# Patient Record
Sex: MALE | Race: WHITE | NOT HISPANIC OR LATINO
[De-identification: names, ages, dates, MRNs, and addresses within clinical notes are randomized per-mention and may not be internally consistent; named-entity substitution may affect disease eponyms.]

---
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
## 2020-11-18 ENCOUNTER — TRANSCRIPTION ENCOUNTER (OUTPATIENT)
Age: 20
End: 2020-11-18

## 2021-06-17 ENCOUNTER — TRANSCRIPTION ENCOUNTER (OUTPATIENT)
Age: 21
End: 2021-06-17

## 2023-11-27 ENCOUNTER — APPOINTMENT (OUTPATIENT)
Dept: RADIOLOGY | Facility: CLINIC | Age: 23
End: 2023-11-27
Payer: COMMERCIAL

## 2023-11-27 PROCEDURE — 71110 X-RAY EXAM RIBS BIL 3 VIEWS: CPT

## 2023-11-27 PROCEDURE — 72070 X-RAY EXAM THORAC SPINE 2VWS: CPT

## 2023-11-27 PROCEDURE — 71046 X-RAY EXAM CHEST 2 VIEWS: CPT

## 2023-12-21 ENCOUNTER — EMERGENCY (EMERGENCY)
Facility: HOSPITAL | Age: 23
LOS: 1 days | Discharge: HOME | End: 2023-12-21
Attending: EMERGENCY MEDICINE | Admitting: EMERGENCY MEDICINE
Payer: COMMERCIAL

## 2023-12-21 VITALS
TEMPERATURE: 98 F | DIASTOLIC BLOOD PRESSURE: 65 MMHG | HEART RATE: 95 BPM | RESPIRATION RATE: 18 BRPM | SYSTOLIC BLOOD PRESSURE: 108 MMHG | OXYGEN SATURATION: 99 %

## 2023-12-21 VITALS
RESPIRATION RATE: 15 BRPM | WEIGHT: 164.91 LBS | TEMPERATURE: 99 F | DIASTOLIC BLOOD PRESSURE: 66 MMHG | OXYGEN SATURATION: 98 % | SYSTOLIC BLOOD PRESSURE: 111 MMHG | HEIGHT: 69 IN | HEART RATE: 93 BPM

## 2023-12-21 PROCEDURE — 73000 X-RAY EXAM OF COLLAR BONE: CPT | Mod: 26,50

## 2023-12-21 PROCEDURE — 73030 X-RAY EXAM OF SHOULDER: CPT | Mod: 26,LT

## 2023-12-21 PROCEDURE — 73030 X-RAY EXAM OF SHOULDER: CPT

## 2023-12-21 PROCEDURE — 99284 EMERGENCY DEPT VISIT MOD MDM: CPT | Mod: 25

## 2023-12-21 PROCEDURE — 73000 X-RAY EXAM OF COLLAR BONE: CPT

## 2023-12-21 PROCEDURE — 71101 X-RAY EXAM UNILAT RIBS/CHEST: CPT | Mod: 26

## 2023-12-21 PROCEDURE — 71101 X-RAY EXAM UNILAT RIBS/CHEST: CPT

## 2023-12-21 PROCEDURE — 99285 EMERGENCY DEPT VISIT HI MDM: CPT

## 2023-12-21 RX ORDER — OXYCODONE AND ACETAMINOPHEN 5; 325 MG/1; MG/1
1 TABLET ORAL ONCE
Refills: 0 | Status: DISCONTINUED | OUTPATIENT
Start: 2023-12-21 | End: 2023-12-21

## 2023-12-21 RX ORDER — OXYCODONE AND ACETAMINOPHEN 5; 325 MG/1; MG/1
1 TABLET ORAL
Qty: 12 | Refills: 0
Start: 2023-12-21 | End: 2023-12-23

## 2023-12-21 RX ADMIN — OXYCODONE AND ACETAMINOPHEN 1 TABLET(S): 5; 325 TABLET ORAL at 15:33

## 2023-12-21 NOTE — ED PROVIDER NOTE - CARE PROVIDER_API CALL
Richard Chambers  Orthopaedic Surgery  [address] [place] [address]  Phone: [phone]  Fax: [phone]  Follow Up Time: 1-3 Days   Richard Chambers  Orthopaedic Surgery  [address] [place] [address]  Phone: [phone]  Fax: [phone]  Follow Up Time: 1-3 Days

## 2023-12-21 NOTE — ED PROVIDER NOTE - PROGRESS NOTE DETAILS
case discussed with ortho PA Lavon, recommends sling and close ortho follow up. Patient was scheduled with appointment for next tuesday with Dr. Chambers.

## 2023-12-21 NOTE — ED ADULT NURSE NOTE - NS ED NOTE ABUSE SUSPICION NEGLECT YN
[FreeTextEntry1] : I performed a venous duplex which was medically necessary to evaluate for DVT. It showed no evidence of DVT in the lower extremities bilaterally.\par  No

## 2023-12-21 NOTE — ED PROVIDER NOTE - MUSCULOSKELETAL MINIMAL EXAM
Tenderness to right anterior chest wall, no step-off or crepitus.  Tenderness to left shoulder and distal clavicle with obvious deformity at AC joint concerning for for AC separation.  No midline cervical, thoracic or lumbar tenderness.  No pelvic tenderness with full range of motion of hips.  Radial and DP pulses equal and intact.

## 2023-12-21 NOTE — ED PROCEDURE NOTE - CPROC ED TIME OUT STATEMENT1
“Patient's name, , procedure and correct site were confirmed during the [place] Timeout.” “Patient's name, , procedure and correct site were confirmed during the [place] Timeout.”

## 2023-12-21 NOTE — ED ADULT TRIAGE NOTE - CHIEF COMPLAINT QUOTE
Left shoulder pain and discomfort s/p fall while snow-boarding 3 hours ago. Pt able to move finger freely. Pt also c/o right clavicle pain and inability to take a full breath

## 2023-12-21 NOTE — ED ADULT NURSE NOTE - OBJECTIVE STATEMENT
Pt fell snowboarding this am, c/o 8/10 pain to L shoulder and R collarbone. States he hears "crackling/popping" noises when he deep breathes.

## 2023-12-21 NOTE — ED PROVIDER NOTE - PROVIDER TOKENS
PROVIDER:[TOKEN:[[id number]],FOLLOWUP:[1-3 Days]] PROVIDER:[TOKEN:[[id number]],FOLLOWUP:[1-3 Days]]

## 2023-12-21 NOTE — ED PROVIDER NOTE - MUSCULOSKELETAL [+], MLM
+ bilateral clavicle pain, + right anterior chest wall pain, + left shoulder pain/LIMITED RANGE OF MOTION

## 2023-12-21 NOTE — ED ADULT NURSE NOTE - NSFALLUNIVINTERV_ED_ALL_ED
Bed/Stretcher in lowest position, wheels locked, appropriate side rails in place/Call bell, personal items and telephone in reach/Instruct patient to call for assistance before getting out of bed/chair/stretcher/Non-slip footwear applied when patient is off stretcher/[place] to call system/Physically safe environment - no spills, clutter or unnecessary equipment/Purposeful proactive rounding/Room/bathroom lighting operational, light cord in reach Bed/Stretcher in lowest position, wheels locked, appropriate side rails in place/Call bell, personal items and telephone in reach/Instruct patient to call for assistance before getting out of bed/chair/stretcher/Non-slip footwear applied when patient is off stretcher/[place] to call system/Physically safe environment - no spills, clutter or unnecessary equipment/Purposeful proactive rounding/Room/bathroom lighting operational, light cord in reach

## 2023-12-21 NOTE — ED PROVIDER NOTE - CARE PLAN
1 Principal Discharge DX:	Separation of AC joint, left, initial encounter  Secondary Diagnosis:	Rib fracture

## 2023-12-21 NOTE — ED PROVIDER NOTE - PATIENT PORTAL LINK FT
You can access the FollowMyHealth Patient Portal offered by [facility] by registering at the following website: [email or web address]. By joining [employer]’s FollowMyHealth portal, you will also be able to view your health information using other applications (apps) compatible with our system. You can access the FollowMyHealth Patient Portal offered by [facility] by registering at the following website: [email or web address]. By joining [employer]’s FollowMyHealth portal, you will also be able to view your health information using other applications (apps) compatible with our system.

## 2023-12-21 NOTE — ED PROVIDER NOTE - DIFFERENTIAL DIAGNOSIS
rib fracture, clavicle fracture, shoulder fracture, dislocation, ac joint separation, pneumothorax Differential Diagnosis

## 2023-12-21 NOTE — ED PROVIDER NOTE - NSFOLLOWUPINSTRUCTIONS_ED_ALL_ED_FT
You were scheduled for an appointment with Dr. Richard Chambers on Tuesday 12/27 at 9:45 am.     Shoulder Separation    A shoulder separation (acromioclavicular separation) is an injury to the connecting tissue (ligament) between the top of your shoulder blade (acromion) and your collarbone (clavicle).     The ligament may be stretched, partially torn, or completely torn.    A stretched ligament may not cause very much pain, and it does not move the collarbone out of place. A stretched ligament looks normal on an X-ray.  An injury that is a bit worse may partially tear a ligament and move the collarbone slightly out of place.  A serious injury completely tears both shoulder ligaments. This moves the collarbone severely out of position and changes the way that the shoulder looks (deformity).    CAUSES  The most common cause of a shoulder separation is falling on or receiving a blow to the top of the shoulder. Falling with an outstretched arm may also cause this injury.    RISK FACTORS  You may be at greater risk of a shoulder separation if:    You are male.  You are younger than age 35.  You play a contact sport, such as football or hockey.    SIGNS AND SYMPTOMS  The most common symptom of a shoulder separation is pain on the top of the shoulder after falling on it or receiving a blow to it. Other signs and symptoms include:    Shoulder deformity.  Swelling of the shoulder.  Decreased ability to move the shoulder.  Bruising on top of the shoulder.    DIAGNOSIS  Your health care provider may suspect a shoulder separation based on your symptoms and the details of a recent injury. A physical exam will be done. During this exam, the health care provider may:    Press on your shoulder.  Test the movement of your shoulder.  Ask you to hold a weight in your hand to see if the separation increases.  Do an X-ray.    TREATMENT  A stretch injury may require only a sling, pain medicine, and cold packs. This treatment may last for 2–12 weeks. You may also have physical therapy. A physical therapist will teach you to do daily exercises to strengthen your shoulder muscles and prevent stiffness.  A complete tear may require surgery to repair the torn ligament. After surgery, you will also require a sling, pain medicine, and cold packs. Recovery may take longer. You may also need more physical therapy.    HOME CARE INSTRUCTIONS  Take medicines only as directed by your health care provider.  Apply ice to the top of your shoulder:  Put ice in a plastic bag.  Place a towel between your skin and the bag.  Leave the ice on for 20 minutes, 2–3 times a day.  Wear your sling or splint as directed by your health care provider.   You may be able to remove your sling to do your physical therapy exercises.  Ask your health care provider when you can stop wearing the sling.  Do not do any activities that make your pain worse.  Do not lift anything that is heavier than 10 lb (4.5 kg) on the injured side of your body.  Ask your health care provider when you can return to athletic activities.    SEEK MEDICAL CARE IF:  Your pain medicine is not relieving your pain.  Your pain and stiffness are not improving after 2 weeks.  You are unable to do your physical therapy exercises because of pain or stiffness.    ADDITIONAL NOTES AND INSTRUCTIONS    Please follow up with your Primary MD in 24-48 hr.  Seek immediate medical care for any new/worsening signs or symptoms.     Rib Fracture    WHAT YOU NEED TO KNOW:    A rib fracture is a crack or break in a rib bone. Rib fractures usually heal within 6 weeks. You should be able to return to normal activities before that time. Do not wrap anything around your body to try to splint your ribs. This can prevent you from taking deep breaths and increases your risk for pneumonia.Rib Cage         DISCHARGE INSTRUCTIONS:    Call 911 for any of the following:     You have trouble breathing.      You have new or increased pain.    Return to the emergency department if:     Your pain does not get better, even after treatment.      You have a fever or a cough.     Contact your healthcare provider if:     You have questions or concerns about your condition or care.        Medicines:     NSAIDs help decrease swelling and pain. NSAIDs are available without a doctor's order. Ask your healthcare provider which medicine is right for you. Ask how much to take and when to take it. Take as directed. NSAIDs can cause stomach bleeding and kidney problems if not taken correctly.      Prescription pain medicine may be given. Ask your healthcare provider how to take this medicine safely. Some prescription pain medicines contain acetaminophen. Do not take other medicines that contain acetaminophen without talking to your healthcare provider. Too much acetaminophen may cause liver damage. Prescription pain medicine may cause constipation. Ask your healthcare provider how to prevent or treat constipation.       Take your medicine as directed. Contact your healthcare provider if you think your medicine is not helping or if you have side effects. Tell him or her if you are allergic to any medicine. Keep a list of the medicines, vitamins, and herbs you take. Include the amounts, and when and why you take them. Bring the list or the pill bottles to follow-up visits. Carry your medicine list with you in case of an emergency.    Follow up with your healthcare provider as directed: Write down your questions so you remember to ask them during your visits.    Deep breathing: Deep breathing will decrease your risk for pneumonia. Hug a pillow on the injured side while doing this exercise, to decrease pain. Take a deep breath and hold it for as long as possible. You should let the air out and then cough strongly. Deep breaths help open your airway. You may be given an incentive spirometer to help take deep breaths. Put the plastic piece in your mouth. Take a slow, deep breath. You should then let the air out and cough. Repeat these steps 10 times every hour.    Rest: Rest and limit activity to decrease swelling and pain, and allow your injury to heal. Avoid activities that may cause more pain or damage to your ribs such as, pulling, pushing, and lifting. As your pain decreases, begin movements slowly. Take short walks between rest periods.    Ice: Apply ice on the fractured area for 15 to 20 minutes every hour or as directed. Use an ice pack or put crushed ice in a plastic bag. Cover it with a towel. Ice helps prevent tissue damage and decreases swelling and pain.       © Copyright [employer] 2019 All illustrations and images included in CareNotes are the copyrighted property of [employer] or [employer] You were scheduled for an appointment with Dr. Richard Chambers on Tuesday 12/27 at 9:45 am.     Shoulder Separation    A shoulder separation (acromioclavicular separation) is an injury to the connecting tissue (ligament) between the top of your shoulder blade (acromion) and your collarbone (clavicle).     The ligament may be stretched, partially torn, or completely torn.    A stretched ligament may not cause very much pain, and it does not move the collarbone out of place. A stretched ligament looks normal on an X-ray.  An injury that is a bit worse may partially tear a ligament and move the collarbone slightly out of place.  A serious injury completely tears both shoulder ligaments. This moves the collarbone severely out of position and changes the way that the shoulder looks (deformity).    CAUSES  The most common cause of a shoulder separation is falling on or receiving a blow to the top of the shoulder. Falling with an outstretched arm may also cause this injury.    RISK FACTORS  You may be at greater risk of a shoulder separation if:    You are male.  You are younger than age 35.  You play a contact sport, such as football or hockey.    SIGNS AND SYMPTOMS  The most common symptom of a shoulder separation is pain on the top of the shoulder after falling on it or receiving a blow to it. Other signs and symptoms include:    Shoulder deformity.  Swelling of the shoulder.  Decreased ability to move the shoulder.  Bruising on top of the shoulder.    DIAGNOSIS  Your health care provider may suspect a shoulder separation based on your symptoms and the details of a recent injury. A physical exam will be done. During this exam, the health care provider may:    Press on your shoulder.  Test the movement of your shoulder.  Ask you to hold a weight in your hand to see if the separation increases.  Do an X-ray.    TREATMENT  A stretch injury may require only a sling, pain medicine, and cold packs. This treatment may last for 2–12 weeks. You may also have physical therapy. A physical therapist will teach you to do daily exercises to strengthen your shoulder muscles and prevent stiffness.  A complete tear may require surgery to repair the torn ligament. After surgery, you will also require a sling, pain medicine, and cold packs. Recovery may take longer. You may also need more physical therapy.    HOME CARE INSTRUCTIONS  Take medicines only as directed by your health care provider.  Apply ice to the top of your shoulder:  Put ice in a plastic bag.  Place a towel between your skin and the bag.  Leave the ice on for 20 minutes, 2–3 times a day.  Wear your sling or splint as directed by your health care provider.   You may be able to remove your sling to do your physical therapy exercises.  Ask your health care provider when you can stop wearing the sling.  Do not do any activities that make your pain worse.  Do not lift anything that is heavier than 10 lb (4.5 kg) on the injured side of your body.  Ask your health care provider when you can return to athletic activities.    SEEK MEDICAL CARE IF:  Your pain medicine is not relieving your pain.  Your pain and stiffness are not improving after 2 weeks.  You are unable to do your physical therapy exercises because of pain or stiffness.    ADDITIONAL NOTES AND INSTRUCTIONS    Please follow up with your Primary MD in 24-48 hr.  Seek immediate medical care for any new/worsening signs or symptoms.     Rib Fracture    WHAT YOU NEED TO KNOW:    A rib fracture is a crack or break in a rib bone. Rib fractures usually heal within 6 weeks. You should be able to return to normal activities before that time. Do not wrap anything around your body to try to splint your ribs. This can prevent you from taking deep breaths and increases your risk for pneumonia.Rib Cage         DISCHARGE INSTRUCTIONS:    Call 911 for any of the following:     You have trouble breathing.      You have new or increased pain.    Return to the emergency department if:     Your pain does not get better, even after treatment.      You have a fever or a cough.     Contact your healthcare provider if:     You have questions or concerns about your condition or care.        Medicines:     NSAIDs help decrease swelling and pain. NSAIDs are available without a doctor's order. Ask your healthcare provider which medicine is right for you. Ask how much to take and when to take it. Take as directed. NSAIDs can cause stomach bleeding and kidney problems if not taken correctly.      Prescription pain medicine may be given. Ask your healthcare provider how to take this medicine safely. Some prescription pain medicines contain acetaminophen. Do not take other medicines that contain acetaminophen without talking to your healthcare provider. Too much acetaminophen may cause liver damage. Prescription pain medicine may cause constipation. Ask your healthcare provider how to prevent or treat constipation.       Take your medicine as directed. Contact your healthcare provider if you think your medicine is not helping or if you have side effects. Tell him or her if you are allergic to any medicine. Keep a list of the medicines, vitamins, and herbs you take. Include the amounts, and when and why you take them. Bring the list or the pill bottles to follow-up visits. Carry your medicine list with you in case of an emergency.    Follow up with your healthcare provider as directed: Write down your questions so you remember to ask them during your visits.    Deep breathing: Deep breathing will decrease your risk for pneumonia. Hug a pillow on the injured side while doing this exercise, to decrease pain. Take a deep breath and hold it for as long as possible. You should let the air out and then cough strongly. Deep breaths help open your airway. You may be given an incentive spirometer to help take deep breaths. Put the plastic piece in your mouth. Take a slow, deep breath. You should then let the air out and cough. Repeat these steps 10 times every hour.    Rest: Rest and limit activity to decrease swelling and pain, and allow your injury to heal. Avoid activities that may cause more pain or damage to your ribs such as, pulling, pushing, and lifting. As your pain decreases, begin movements slowly. Take short walks between rest periods.    Ice: Apply ice on the fractured area for 15 to 20 minutes every hour or as directed. Use an ice pack or put crushed ice in a plastic bag. Cover it with a towel. Ice helps prevent tissue damage and decreases swelling and pain.       © Copyright [employer] 2019 All illustrations and images included in CareNotes are the copyrighted property of [employer] or [employer]

## 2023-12-21 NOTE — ED PROVIDER NOTE - CLINICAL SUMMARY MEDICAL DECISION MAKING FREE TEXT BOX
Patient is a 23-year-old male who presents to the emergency room with left shoulder pain and right rib pain status post fall while snowboarding.  Patient with no significant past medical history is not on any medication.  Reports around 10 AM he was snowboarding was wearing protective gear including a helmet.  He suffered a fall and has been complaining of right rib pain worse with movement and deep inspiration and left shoulder pain.  Also with bilateral clavicular pain.  Patient is right-hand dominant.  Denies striking his head or LOC.  Patient is not on anticoagulants.  Was able to stand has been ambulatory since then drove back to [place].  Denies any extremity numbness or weakness.  Denies any headache visual changes nausea vomiting abdominal pain.  On exam patient is sitting no acute distress speaking in full sentences no signs or symptoms of respiratory distress no hypoxia on room air.  Normocephalic atraumatic pupils equal round and reactive heart is regular rate lungs are clear to auscultation there is chest wall tenderness to palpation to the right lateral ribs.  Abdomen soft nontender nondistended.  No bruising noted to the chest or abdomen.  No midline C-T-L tenderness to palpation.  No deformity noted to the right clavicle patient with full range of motion of the right shoulder no tenderness to palpation to the right shoulder.  No tenderness palpation to the right elbow forearm wrist or hand.  Neurovascular intact right upper extremity.  There is what appears to be a significant AC separation of the left clavicle shoulder.  No acute bony deformity to the humeral head itself.  No tenderness palpation to the humerus left elbow forearm wrist or hand.  Sensation grossly intact to the left upper extremity positive pedal pulse cap refill less than 2 seconds.  No pelvic tenderness to palpation.  Patient denies any lower extremity pain and has been ambulatory.  Patient presenting to the emergency room with right rib pain and left clavicular shoulder pain.  Differential includes but not limited to fracture sprain strain.  Also consider shoulder dislocation.  Neurovascular intact at this time.  Will obtain x-rays and medicate for pain.  I independently reviewed the x-rays.  There is a nondisplaced right fifth rib fracture no pneumothorax.  Patient made aware of this also made aware that additional rib fractures can be missed on imaging.  As he is no hypoxia or secondary signs of distress the plan of care would not change.  Will treat for pain provide incentive spirometer education provided.  I independently reviewed the clavicle and shoulder films and noted a significant AC joint separation.  There is no shoulder dislocation or fracture.  Orthopedics was consulted in agreement to place patient in sling at this time and to follow-up outpatient for further workup and management.  Patient placed in sling remains neurovascular intact stable for discharge. Patient is a 23-year-old male who presents to the emergency room with left shoulder pain and right rib pain status post fall while snowboarding.  Patient with no significant past medical history is not on any medication.  Reports around 10 AM he was snowboarding was wearing protective gear including a helmet.  He suffered a fall and has been complaining of right rib pain worse with movement and deep inspiration and left shoulder pain.  Also with bilateral clavicular pain.  Patient is right-hand dominant.  Denies striking his head or LOC.  Patient is not on anticoagulants.  Was able to stand has been ambulatory since then drove back to [place].  Denies any extremity numbness or weakness.  Denies any headache visual changes nausea vomiting abdominal pain.  On exam patient is sitting no acute distress speaking in full sentences no signs or symptoms of respiratory distress no hypoxia on room air.  Normocephalic atraumatic pupils equal round and reactive heart is regular rate lungs are clear to auscultation there is chest wall tenderness to palpation to the right lateral ribs.  Abdomen soft nontender nondistended.  No bruising noted to the chest or abdomen.  No midline C-T-L tenderness to palpation.  No deformity noted to the right clavicle patient with full range of motion of the right shoulder no tenderness to palpation to the right shoulder.  No tenderness palpation to the right elbow forearm wrist or hand.  Neurovascular intact right upper extremity.  There is what appears to be a significant AC separation of the left clavicle shoulder.  No acute bony deformity to the humeral head itself.  No tenderness palpation to the humerus left elbow forearm wrist or hand.  Sensation grossly intact to the left upper extremity positive pedal pulse cap refill less than 2 seconds.  No pelvic tenderness to palpation.  Patient denies any lower extremity pain and has been ambulatory.  Patient presenting to the emergency room with right rib pain and left clavicular shoulder pain.  Differential includes but not limited to fracture sprain strain.  Also consider shoulder dislocation.  Neurovascular intact at this time.  Will obtain x-rays and medicate for pain.  I independently reviewed the x-rays.  There is a nondisplaced right fifth rib fracture no pneumothorax.  Patient made aware of this also made aware that additional rib fractures can be missed on imaging.  As he is no hypoxia or secondary signs of distress the plan of care would not change.  Will treat for pain provide incentive spirometer education provided.  I independently reviewed the clavicle and shoulder films and noted a significant AC joint separation.  There is no shoulder dislocation or fracture.  Orthopedics was consulted in agreement to place patient in sling at this time and to follow-up outpatient for further workup and management.  Patient placed in sling remains neurovascular intact stable for discharge.

## 2023-12-25 ENCOUNTER — EMERGENCY (EMERGENCY)
Facility: HOSPITAL | Age: 23
LOS: 1 days | Discharge: HOME | End: 2023-12-25
Attending: EMERGENCY MEDICINE | Admitting: EMERGENCY MEDICINE
Payer: COMMERCIAL

## 2023-12-25 VITALS
HEIGHT: 69 IN | RESPIRATION RATE: 18 BRPM | OXYGEN SATURATION: 99 % | DIASTOLIC BLOOD PRESSURE: 66 MMHG | TEMPERATURE: 99 F | WEIGHT: 164.91 LBS | SYSTOLIC BLOOD PRESSURE: 117 MMHG | HEART RATE: 84 BPM

## 2023-12-25 PROCEDURE — 71250 CT THORAX DX C-: CPT | Mod: 26,MA

## 2023-12-25 PROCEDURE — 99284 EMERGENCY DEPT VISIT MOD MDM: CPT

## 2023-12-25 PROCEDURE — 71250 CT THORAX DX C-: CPT | Mod: MA

## 2023-12-25 PROCEDURE — 99284 EMERGENCY DEPT VISIT MOD MDM: CPT | Mod: 25

## 2023-12-25 NOTE — ED ADULT TRIAGE NOTE - CHIEF COMPLAINT QUOTE
I was diagnosed with a rib fracture on the right on thursday, it became more painful and I am having difficulty breathing today

## 2023-12-25 NOTE — ED PROVIDER NOTE - DIFFERENTIAL DIAGNOSIS
Differential diagnosis includes chest wall contusion hemothorax hemopneumothorax pneumothorax rib fractures pulmonary contusion Differential Diagnosis

## 2023-12-25 NOTE — ED ADULT NURSE NOTE - OBJECTIVE STATEMENT
23yr old male a&ox4 arrives to ED from home c/o difficulty breathing, pt notes top have been seen in ED but notes difficulty breathing today. speech clear no obstruction airway, pt denies fever chills, chest pain or sob at this time. Shannen DEAN

## 2023-12-25 NOTE — ED PROVIDER NOTE - NSFOLLOWUPINSTRUCTIONS_ED_ALL_ED_FT
Rest.  May continue Motrin and or Tylenol for pain if needed and as directed.  Continue using the incentive spirometer 1-2 times per hour until rib fracture is healed.  Follow-up with your primary care physician this week for reevaluation.  Return if needed.

## 2023-12-25 NOTE — ED PROVIDER NOTE - PATIENT PORTAL LINK FT
You can access the FollowMyHealth Patient Portal offered by [facility] by registering at the following website: [email or web address]. By joining [employer]’s FollowMyHealth portal, you will also be able to view your health information using other applications (apps) compatible with our system. You can access the FollowMyHealth Patient Portal offered by [facility] by registering at the following website: [email or web address]. By joining [employer]’s FollowMyHealth portal, you will also be able to view your health information using other applications (apps) compatible with our system.

## 2023-12-25 NOTE — ED PROVIDER NOTE - OBJECTIVE STATEMENT
Patient is a 23-year-old white male who last week sustained a left AC separation and a right fifth rib fracture secondary to snowboard accident and while he was doing well patient did developed acute onset of right-sided chest pain last evening at approximately 8 PM when he suddenly coughed.  Pain was sharp acute in onset and worsened a lot more recently with movement deep inspiration and palpation.  No fever no chills no real shortness of breath.  He has been using an incentive spirometer recently.  No abdominal pain.

## 2023-12-25 NOTE — ED PROVIDER NOTE - PHYSICAL EXAMINATION
Examination reveals a well-developed well-nourished white male in mild distress secondary to chest wall pain.  HEENT normocephalic atraumatic pupils equal round react light accommodation extraocular is intact neck is supple no C-spine tenderness lungs are clear chest wall reveals reproducible right-sided chest wall pain more laterally and above the level of the nipple.  Heart is regular rate and rhythm without any murmurs rubs gallops abdomen is completely soft and nontender all 4 quadrants positive bowel sounds.  Neurologically alert oriented x 4 without any focal neurologic deficits

## 2023-12-25 NOTE — ED PROVIDER NOTE - CARE PLAN
1 Principal Discharge DX:	Fracture of one rib of right side  Secondary Diagnosis:	Contusion of chest wall

## 2023-12-25 NOTE — ED PROVIDER NOTE - CARE PROVIDER_API CALL
Andrew Delong  Pulmonary Disease  [address] [place] [id number]  Phone: [phone]  Fax: [phone]  Follow Up Time:    Andrew Delong  Pulmonary Disease  [address] [place] [id number]  Phone: [phone]  Fax: [phone]  Follow Up Time:

## 2023-12-25 NOTE — ED PROVIDER NOTE - CLINICAL SUMMARY MEDICAL DECISION MAKING FREE TEXT BOX
Blunt trauma right lateral chest wall during while snowboarding last week sustained fracture followed by coughing last night with secondary worsening of the sharp pain requiring thorough evaluation as well as CT scan.

## 2023-12-25 NOTE — ED ADULT NURSE NOTE - NSFALLUNIVINTERV_ED_ALL_ED
Bed/Stretcher in lowest position, wheels locked, appropriate side rails in place/Call bell, personal items and telephone in reach/Instruct patient to call for assistance before getting out of bed/chair/stretcher/Non-slip footwear applied when patient is off stretcher/[place] to call system/Physically safe environment - no spills, clutter or unnecessary equipment/Purposeful proactive rounding/Room/bathroom lighting operational, light cord in reach Bed/Stretcher in lowest position, wheels locked, appropriate side rails in place/Call bell, personal items and telephone in reach/Instruct patient to call for assistance before getting out of bed/chair/stretcher/Non-slip footwear applied when patient is off stretcher/[place] to call system/Physically safe environment - no spills, clutter or unnecessary equipment/Purposeful proactive rounding/Room/bathroom lighting operational, light cord in reach